# Patient Record
Sex: MALE | Race: WHITE | Employment: FULL TIME | URBAN - METROPOLITAN AREA
[De-identification: names, ages, dates, MRNs, and addresses within clinical notes are randomized per-mention and may not be internally consistent; named-entity substitution may affect disease eponyms.]

---

## 2023-05-22 ENCOUNTER — APPOINTMENT (OUTPATIENT)
Dept: GENERAL RADIOLOGY | Age: 28
End: 2023-05-22
Payer: MEDICAID

## 2023-05-22 ENCOUNTER — HOSPITAL ENCOUNTER (EMERGENCY)
Age: 28
Discharge: HOME | End: 2023-05-22
Payer: MEDICAID

## 2023-05-22 VITALS
WEIGHT: 125 LBS | OXYGEN SATURATION: 99 % | DIASTOLIC BLOOD PRESSURE: 79 MMHG | HEART RATE: 81 BPM | BODY MASS INDEX: 19.62 KG/M2 | HEIGHT: 67 IN | TEMPERATURE: 97.2 F | RESPIRATION RATE: 16 BRPM | SYSTOLIC BLOOD PRESSURE: 139 MMHG

## 2023-05-22 DIAGNOSIS — S93.401A SPRAIN OF RIGHT ANKLE, UNSPECIFIED LIGAMENT, INITIAL ENCOUNTER: Primary | ICD-10-CM

## 2023-05-22 PROCEDURE — 99283 EMERGENCY DEPT VISIT LOW MDM: CPT

## 2023-05-22 PROCEDURE — 73610 X-RAY EXAM OF ANKLE: CPT

## 2023-05-22 PROCEDURE — 6370000000 HC RX 637 (ALT 250 FOR IP): Performed by: NURSE PRACTITIONER

## 2023-05-22 PROCEDURE — 73630 X-RAY EXAM OF FOOT: CPT

## 2023-05-22 RX ORDER — NAPROXEN 250 MG/1
250 TABLET ORAL ONCE
Status: COMPLETED | OUTPATIENT
Start: 2023-05-22 | End: 2023-05-22

## 2023-05-22 RX ORDER — NAPROXEN 250 MG/1
250 TABLET ORAL 2 TIMES DAILY WITH MEALS
Qty: 20 TABLET | Refills: 1 | Status: SHIPPED | OUTPATIENT
Start: 2023-05-22

## 2023-05-22 RX ADMIN — NAPROXEN SODIUM 250 MG: 250 TABLET ORAL at 19:36

## 2023-05-22 ASSESSMENT — ENCOUNTER SYMPTOMS
ABDOMINAL PAIN: 0
SHORTNESS OF BREATH: 0
FACIAL SWELLING: 0
SORE THROAT: 0
COLOR CHANGE: 0
RHINORRHEA: 0

## 2023-05-22 ASSESSMENT — PAIN DESCRIPTION - LOCATION: LOCATION: FOOT

## 2023-05-22 ASSESSMENT — PAIN SCALES - GENERAL
PAINLEVEL_OUTOF10: 8
PAINLEVEL_OUTOF10: 5
PAINLEVEL_OUTOF10: 7

## 2023-05-22 ASSESSMENT — PAIN DESCRIPTION - ORIENTATION: ORIENTATION: RIGHT

## 2023-05-22 ASSESSMENT — LIFESTYLE VARIABLES
HOW MANY STANDARD DRINKS CONTAINING ALCOHOL DO YOU HAVE ON A TYPICAL DAY: PATIENT DOES NOT DRINK
HOW OFTEN DO YOU HAVE A DRINK CONTAINING ALCOHOL: NEVER

## 2023-05-22 ASSESSMENT — PAIN - FUNCTIONAL ASSESSMENT: PAIN_FUNCTIONAL_ASSESSMENT: 0-10
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
